# Patient Record
Sex: MALE | Race: WHITE | NOT HISPANIC OR LATINO
[De-identification: names, ages, dates, MRNs, and addresses within clinical notes are randomized per-mention and may not be internally consistent; named-entity substitution may affect disease eponyms.]

---

## 2022-06-28 ENCOUNTER — NON-APPOINTMENT (OUTPATIENT)
Age: 47
End: 2022-06-28

## 2024-01-09 ENCOUNTER — EMERGENCY (EMERGENCY)
Facility: HOSPITAL | Age: 49
LOS: 0 days | Discharge: HOME | End: 2024-01-09
Attending: STUDENT IN AN ORGANIZED HEALTH CARE EDUCATION/TRAINING PROGRAM
Payer: COMMERCIAL

## 2024-01-09 VITALS
OXYGEN SATURATION: 95 % | TEMPERATURE: 98 F | HEIGHT: 64 IN | DIASTOLIC BLOOD PRESSURE: 93 MMHG | WEIGHT: 231.93 LBS | RESPIRATION RATE: 17 BRPM | HEART RATE: 63 BPM | SYSTOLIC BLOOD PRESSURE: 136 MMHG

## 2024-01-09 DIAGNOSIS — M54.50 LOW BACK PAIN, UNSPECIFIED: ICD-10-CM

## 2024-01-09 DIAGNOSIS — Z91.018 ALLERGY TO OTHER FOODS: ICD-10-CM
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
## 2024-01-09 PROCEDURE — 99284 EMERGENCY DEPT VISIT MOD MDM: CPT

## 2024-01-09 RX ORDER — LIDOCAINE 4 G/100G
1 CREAM TOPICAL ONCE
Refills: 0 | Status: COMPLETED | OUTPATIENT
Start: 2024-01-09 | End: 2024-01-09

## 2024-01-09 RX ORDER — ACETAMINOPHEN 500 MG
975 TABLET ORAL ONCE
Refills: 0 | Status: COMPLETED | OUTPATIENT
Start: 2024-01-09 | End: 2024-01-09

## 2024-01-09 RX ORDER — IBUPROFEN 200 MG
400 TABLET ORAL ONCE
Refills: 0 | Status: COMPLETED | OUTPATIENT
Start: 2024-01-09 | End: 2024-01-09

## 2024-01-09 RX ORDER — LIDOCAINE 4 G/100G
1 CREAM TOPICAL
Qty: 2 | Refills: 0
Start: 2024-01-09 | End: 2024-01-13

## 2024-01-09 RX ORDER — ACETAMINOPHEN 500 MG
2 TABLET ORAL
Qty: 40 | Refills: 0
Start: 2024-01-09 | End: 2024-01-13

## 2024-01-09 RX ORDER — IBUPROFEN 200 MG
1 TABLET ORAL
Qty: 20 | Refills: 0
Start: 2024-01-09 | End: 2024-01-13

## 2024-01-09 RX ORDER — CYCLOBENZAPRINE HYDROCHLORIDE 10 MG/1
1 TABLET, FILM COATED ORAL
Qty: 10 | Refills: 0
Start: 2024-01-09 | End: 2024-01-13

## 2024-01-09 RX ORDER — CYCLOBENZAPRINE HYDROCHLORIDE 10 MG/1
5 TABLET, FILM COATED ORAL ONCE
Refills: 0 | Status: DISCONTINUED | OUTPATIENT
Start: 2024-01-09 | End: 2024-01-09

## 2024-01-09 RX ADMIN — Medication 400 MILLIGRAM(S): at 10:42

## 2024-01-09 RX ADMIN — LIDOCAINE 1 PATCH: 4 CREAM TOPICAL at 10:43

## 2024-01-09 RX ADMIN — Medication 975 MILLIGRAM(S): at 10:42

## 2024-01-09 NOTE — ED PROVIDER NOTE - OBJECTIVE STATEMENT
Patient is a 48 year-old-male with no reported PMH/PSH presents with R lower back pain. Reports that when he went to put on his socks about few days ago, he bent down and felt pain in the R lower back. Felt as though he had pulled a muscle. Reports that pain occasionally radiates down to the right leg with some numbness/tingling sensation, worsen with movement and has been having difficulty doing his usual activity due to pain. Took ibuprofen yesterday with some relief. Denies fever, chills, nausea, vomiting, abdominal pain, urinary or bowel incontinence, saddle anesthesia.

## 2024-01-09 NOTE — ED PROVIDER NOTE - NSFOLLOWUPCLINICS_GEN_ALL_ED_FT
[facility]  Sports Medicine  [address] [place] [address]  Phone: [phone]  Fax:      [facility] Sports [facility]  Sports Medicine  [address] [place] [address]  Phone: [phone]  Fax:

## 2024-01-09 NOTE — ED PROVIDER NOTE - NSFOLLOWUPINSTRUCTIONS_ED_ALL_ED_FT
You were seen in the emergency department for right lower back pain. Please follow up with your primary care doctor within 48 hours for continuation of care. Please follow up with Sports Medicine Clinic within a week for further management of lower back pain.     Return to the emergency department if you experience any new/concerning/worsening symptoms such as but not limited to: fever (>100.3F), intractable nausea, vomiting, chest pain, shortness of breath, abdominal pain, numbness in the inner thighs, urinary or bowel incontinence, weakness in extremities.     For pain, you may take:  1) Acetaminophen (Tylenol): 500mg every 6 hours as needed for pain   2) Ibuprofen (Advil/Motrin): 400mg every 6 hours as needed for pain   3) Lidocaine patch: apply 1 patch to affected area every 12 hours as needed for pain   4) Cyclobenzaprine (Flexeril): 5mg every 12 hours as needed for muscle spasm

## 2024-01-09 NOTE — ED PROVIDER NOTE - PATIENT PORTAL LINK FT
You can access the FollowMyHealth Patient Portal offered by [facility] by registering at the following website: [email or web address]. By joining [employer]’s FollowMyHealth portal, you will also be able to view your health information using other applications (apps) compatible with our system. You can access the FollowMyHealth Patient Portal offered by [facility] by registering at the following website: [email or web address]. By joining [facility]’s FollowMyHealth portal, you will also be able to view your health information using other applications (apps) compatible with our system.

## 2024-01-09 NOTE — ED PROVIDER NOTE - ATTENDING APP SHARED VISIT CONTRIBUTION OF CARE
47 y/o M with PMH deafness presenting to the ED c/o back pain. Pain began a few days ago upon bending down, feels like a pulled muscle. Took motrin yesterday with some relief.  Reports occasional radiation down r leg. No bowel or bladder incontinence, saddle anesthesia, or other acute symptoms.  In the ED, vitals stable.  Patient is neuro intact.  Sx consistent w/ sciatica.  Plan for pain control.  Provide ortho f/u

## 2024-01-09 NOTE — ED ADULT NURSE NOTE - CAS DISCH TRANSFER METHOD
Telephone Encounter by Rebeca Pena RN at 05/08/18 06:48 PM     Author:  Rebeca Pena RN Service:  (none) Author Type:  Registered Nurse     Filed:  05/08/18 06:49 PM Encounter Date:  4/12/2018 Status:  Signed     :  Rebeca Pena RN (Registered Nurse)            Minocin refillx3 per Dermatology protocol.[JW1.1M]       Revision History        User Key Date/Time User Provider Type Action    > JW1.1 05/08/18 06:49 PM Rebeca Pena, RN Registered Nurse Sign    M - Manual             Private car

## 2024-01-09 NOTE — ED ADULT NURSE NOTE - OBJECTIVE STATEMENT
48yM presenting to ED with right lower back pain x 3 days. pt is A&Ox3,  used. pt reports he twisted his back the other day and when he went to put on his right sock he felt a sharp pain. pt has been unable to run as his usual, and feels a tight knot in the area. no relevant pmhx. pt has no other pain or concerns at this time.

## 2024-01-09 NOTE — ED ADULT TRIAGE NOTE - CHIEF COMPLAINT QUOTE
Patient presents to ED c/o right lower back pain x 3 days. Patient went to pick something up and twisted body and may have pulled a muscle, has been having difficulty getting dressed and moving. Patient took motrin with partial relief.   no pmh

## 2024-01-09 NOTE — ED PROVIDER NOTE - PHYSICAL EXAMINATION
Vitals: I have reviewed the patients vital signs  General: Well dressed, well appearing, no acute distress  HEENT: Atraumatic, normocephalic, airway patent  Eyes: EOMI, tracking appropriately  Neck: no tracheal deviation, no JVD  Chest/Lungs: no trauma, symmetric chest rise, speaking in complete sentences, no WOB  Heart: skin and extremities well perfused, regular rate and rhythm  Abdomen: soft, nontender and nondistended, no CVA tenderness   Neuro: A+Ox3, ambulating without difficulty, CN grossly intact  MSK: active ROM of all 4 extremities with 5/5 strength and normal sensation, +R lumbar paraspinal tenderness, no midline c/t/l tenderness   Skin: no cyanosis, no jaundice, no new emergent lesions

## 2024-01-09 NOTE — ED PROVIDER NOTE - CLINICAL SUMMARY MEDICAL DECISION MAKING FREE TEXT BOX
Patient is a 48 year-old-male with no reported PMH/PSH presents with R lower back pain. Exam remarkable for + R lumbar paraspinal tenderness. Concerning for lumbar radiculopathy vs muscular strain. Low suspicion for cord compression/epidural abscess. Normal neuro exam with intact strength and sensation. Will provide pain control here and refer to sports medicine. Patient is a 48 year-old-male with no reported PMH/PSH presents with R lower back pain. Exam remarkable for + R lumbar paraspinal tenderness. Concerning for lumbar radiculopathy vs muscular strain. Low suspicion for cord compression/epidural abscess. Normal neuro exam with intact strength and sensation. Will provide pain control here and refer to sports medicine.   #[id number] Toya Patient is a 48 year-old-male with no reported PMH/PSH presents with R lower back pain. Exam remarkable for + R lumbar paraspinal tenderness. Concerning for lumbar radiculopathy vs muscular strain. Low suspicion for cord compression/epidural abscess. Normal neuro exam with intact strength and sensation. Will provide pain control here and refer to sports medicine.   #[id number] Toya

## 2024-01-09 NOTE — ED ADULT NURSE NOTE - NSFALLUNIVINTERV_ED_ALL_ED
Bed/Stretcher in lowest position, wheels locked, appropriate side rails in place/Call bell, personal items and telephone in reach/Instruct patient to call for assistance before getting out of bed/chair/stretcher/Non-slip footwear applied when patient is off stretcher/[place] to call system/Physically safe environment - no spills, clutter or unnecessary equipment/Purposeful proactive rounding/Room/bathroom lighting operational, light cord in reach Bed/Stretcher in lowest position, wheels locked, appropriate side rails in place/Call bell, personal items and telephone in reach/Instruct patient to call for assistance before getting out of bed/chair/stretcher/Non-slip footwear applied when patient is off stretcher/[place] to call system/Physically safe environment - no spills, clutter or unnecessary equipment/Purposeful proactive rounding/Room/bathroom lighting operational, light cord in reach

## 2024-01-10 PROBLEM — Z78.9 OTHER SPECIFIED HEALTH STATUS: Chronic | Status: ACTIVE | Noted: 2024-01-09

## 2024-01-16 ENCOUNTER — APPOINTMENT (OUTPATIENT)
Dept: SPORTS MEDICINE | Facility: CLINIC | Age: 49
End: 2024-01-16
Payer: COMMERCIAL

## 2024-01-16 DIAGNOSIS — M54.50 LOW BACK PAIN, UNSPECIFIED: ICD-10-CM

## 2024-01-16 PROCEDURE — 99204 OFFICE O/P NEW MOD 45 MIN: CPT

## 2024-01-16 PROCEDURE — 99214 OFFICE O/P EST MOD 30 MIN: CPT

## 2024-01-20 NOTE — HISTORY OF PRESENT ILLNESS
[de-identified] : 49 yo M with PMH of right femur fracture in adolescence presents to clinic for atraumatic right lower back pain that began 1 week ago.  assisted with history in clinic. 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 Reports he was reaching backward in his car 1 week ago and thought he may have overextended, and subsequently had pain when attempting to get dressed and putting on socks. Pain localized to right back, no radiation, no tingling or numbness. Denies fever, chills, dysuria, and unintentional weight loss. He went to the ER for his pain and was prescribed Flexeril, ibuprofen, lidocaine patch, which all helped his pain. He is now improved compared to prior. States he has an upcoming tournament in [place] and is requesting if he can attend.

## 2024-01-20 NOTE — DISCUSSION/SUMMARY
[de-identified] : MARICARMEN, FELLOW  ASSESSMENT:  47 yo man presents with lower back pain that is now mostly improved. Differential includes quadratus lumborum strain vs lumbar radiculopathy vs facet arthrosis. Given patient's pain is now improved with medication, there is no radiation of pain and it is focal, likely to have sustained a muscle strain. Recommend physical therapy with core strengthening exercises and NSAIDs PRN. Recommended that patient moves every 2 hours during his car drive to improve symptoms.    PLAN: Follow up in 4-6 weeks.   Additional Testing: None, consider MRI in future if not improved on reassessment with PT   Further Intervention: Physical therapy   Weight-bearing Status: WBAT   Assistive Devices: None   Therapy: HEP recommended and reviewed, PT prescribed   Health Management: BMI/Weight Management and physical activity level reviewed with the patient   Home Modalities: RICE protocol for pain/swelling and home modalities reviewed with the patient   Medications: OTC analgesics   Orthotics:   Work Status: Return to work as tolerated   Activity Status: Avoid aggravating and high impact activities   Sports/Gym Status: As tolerated   Follow-up: 4-6 weeks DR Catalino Salmon MD agree with above.  Impression: lower back strain.  A prescription for PT was provided.  F/u I. 4-6 weeks.

## 2024-01-20 NOTE — REASON FOR VISIT
[Initial Visit] : an initial visit for [Other: ____] : [unfilled] [Time Spent: ____ minutes] : Total time spent using  services: [unfilled] minutes. The patient's primary language is not English thus required  services. [Interpreters_IDNumber] : [id number] [Interpreters_FullName] : KENNETH SALAZAR [Interpreters_Relationshiptopatient] : OFFICIAL  [FreeTextEntry3] : Sign language [TWNoteComboBox1] : Other

## 2024-01-20 NOTE — PHYSICAL EXAM
[de-identified] : General Appearance: Well-nourished, well developed in no acute distress. Orientation: Oriented to person, place and time Gait: normal  Coordination: normal, heel walk and toe walk normal Tenderness: no TTP along right/left paraspinals and no TTP in midline of lumbar spine. Point tenderness to palpation at right paraspinal in lumbar region Flexion: 40 with/out pain Extension: 0 with/out pain Lateral Bend (R/L): 30 /30 Rotation (R/L): 45 / 45 L2 (iliopsoas / mid anterior thigh sensation)= 5/5 : normal L3 (quadriceps / distal anterior thigh sensation)= 5/5 : normal L4 (tibialis anterior / patellar reflex / medial ankle sensation)= 5/5 : 2+ : normal L5 (EHL / dorsal foot sensation)= 5/5 : normal S1 (Peroneals / Achilles reflex / lateral ankle sensation)= 5/5 : 2+ : normal No quad tightness Seated SLR(R/L): - / - Supine SLR (R/L): - / - JOAQUIN Test (R/L): anterior pain - / -

## 2024-01-21 ENCOUNTER — TRANSCRIPTION ENCOUNTER (OUTPATIENT)
Age: 49
End: 2024-01-21

## 2024-02-20 ENCOUNTER — APPOINTMENT (OUTPATIENT)
Dept: SPORTS MEDICINE | Facility: CLINIC | Age: 49
End: 2024-02-20
Payer: COMMERCIAL

## 2024-02-20 PROCEDURE — 99212 OFFICE O/P EST SF 10 MIN: CPT

## 2024-02-20 NOTE — PHYSICAL EXAM
[de-identified] : General Appearance: Well-nourished, well developed in no acute distress. Orientation: Oriented to person, place and time Gait: normal  Coordination: normal, heel walk and toe walk normal Tenderness: no TTP along right/left paraspinals and no TTP in midline of lumbar spine.  Flexion: 40 with/out pain Extension: 0 with/out pain Lateral Bend (R/L): 30 /30 Rotation (R/L): 45 / 45 L2 (iliopsoas / mid anterior thigh sensation)= 5/5 : normal L3 (quadriceps / distal anterior thigh sensation)= 5/5 : normal L4 (tibialis anterior /medial ankle sensation)= 5/5 :normal L5 (EHL / dorsal foot sensation)= 5/5 : normal S1 (Peroneals / lateral ankle sensation)= 5/5 : normal No quad tightness Seated SLR(R/L): - / - Supine SLR (R/L): - / -

## 2024-02-20 NOTE — HISTORY OF PRESENT ILLNESS
[de-identified] : 47 yo M with PMH of right femur fracture in adolescence presents to clinic for atraumatic right lower back pain that began 6 week ago.  assisted with history in clinic. 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 Reports he was reaching backward in his car 1 week ago and thought he may have overextended, and subsequently had pain when attempting to get dressed and putting on socks. Pain localized to right back, no radiation, no tingling or numbness. Denies fever, chills, dysuria, and unintentional weight loss. He went to the ER for his pain and was prescribed Flexeril, ibuprofen, lidocaine patch, which all helped his pain. He is now improved compared to prior.   2/20/2024: Patient has been doing PT with great improvement. Pain has mostly resolved, not affecting current ADLs. Worked his basketball tournament without issue. No new symptoms.  Attn - ALS  used to communicate.  pt feeling much better.  slight stiffness.  he did basketball tournament in [place] without difficulty.  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  He does not feel necessary to continue going to physical therapy.  He occasionally takes NSAIDs.

## 2024-02-20 NOTE — DISCUSSION/SUMMARY
[de-identified] : ASSESSMENT:  49 yo man presents with lower back pain that is now mostly improved after PT.  Differential includes quadratus lumborum strain vs lumbar radiculopathy vs facet arthrosis. Given patient's pain is now improved with medication, there is no radiation of pain and it is focal, likely to have sustained a muscle strain. Would like to d/c PT and continue HEP.     PLAN:  Additional Testing: None Further Intervention: n/a Weight-bearing Status: WBAT Assistive Devices: None Therapy: HEP recommended and reviewed, PT completed Health Management: BMI/Weight Management and physical activity level reviewed with the patient Home Modalities: RICE protocol for pain/swelling and home modalities reviewed with the patient Medications: OTC analgesics Orthotics: n/a Work Status: Return to work as tolerated Activity Status: Avoid aggravating and high impact activities Sports/Gym Status: As tolerated Follow-up: 6 weeks PRN DR Catalino Salmon MD agree with above.  Impression: lower back strain - nearly resolved f/u PRN

## 2024-02-20 NOTE — REVIEW OF SYSTEMS
[Negative] : Neurological [Arthralgia] : no arthralgia [Joint Pain] : no joint pain [Joint Stiffness] : no joint stiffness [Joint Swelling] : no joint swelling

## 2024-02-20 NOTE — REASON FOR VISIT
[Follow-Up Visit] : a follow-up visit for [Other: ____] : [unfilled] [FreeTextEntry3] : Sign language [TWNoteComboBox1] : Other

## 2024-06-05 ENCOUNTER — NON-APPOINTMENT (OUTPATIENT)
Age: 49
End: 2024-06-05

## 2025-09-19 ENCOUNTER — NON-APPOINTMENT (OUTPATIENT)
Age: 50
End: 2025-09-19